# Patient Record
Sex: FEMALE | Race: BLACK OR AFRICAN AMERICAN | Employment: FULL TIME | URBAN - METROPOLITAN AREA
[De-identification: names, ages, dates, MRNs, and addresses within clinical notes are randomized per-mention and may not be internally consistent; named-entity substitution may affect disease eponyms.]

---

## 2020-03-16 ENCOUNTER — HOSPITAL ENCOUNTER (EMERGENCY)
Facility: HOSPITAL | Age: 35
Discharge: HOME | End: 2020-03-16
Attending: EMERGENCY MEDICINE
Payer: COMMERCIAL

## 2020-03-16 ENCOUNTER — OFFICE VISIT (OUTPATIENT)
Dept: OBGYN CLINIC | Facility: CLINIC | Age: 35
End: 2020-03-16

## 2020-03-16 VITALS
TEMPERATURE: 100 F | OXYGEN SATURATION: 99 % | DIASTOLIC BLOOD PRESSURE: 121 MMHG | RESPIRATION RATE: 16 BRPM | WEIGHT: 230 LBS | HEART RATE: 93 BPM | BODY MASS INDEX: 38.32 KG/M2 | SYSTOLIC BLOOD PRESSURE: 158 MMHG | HEIGHT: 65 IN

## 2020-03-16 VITALS — HEART RATE: 85 BPM | SYSTOLIC BLOOD PRESSURE: 180 MMHG | DIASTOLIC BLOOD PRESSURE: 100 MMHG

## 2020-03-16 DIAGNOSIS — I10 ESSENTIAL HYPERTENSION: Primary | ICD-10-CM

## 2020-03-16 DIAGNOSIS — I10 ASYMPTOMATIC HYPERTENSION: Primary | ICD-10-CM

## 2020-03-16 PROCEDURE — 99201 OFFICE/OUTPT VISIT,NEW,LEVL I: CPT | Performed by: ADVANCED PRACTICE MIDWIFE

## 2020-03-16 PROCEDURE — 99282 EMERGENCY DEPT VISIT SF MDM: CPT

## 2020-03-17 NOTE — ED PROVIDER NOTES
Patient Seen in: [facility] AND [facility] Emergency Department      History   Patient presents with:  Hypertension    Stated Complaint: HTN    HPI    12-year-old female with no significant past medical history here with complaints of asymptomatic hypertension f Vitals   BP 03/16/20 1839 (!) 174/115   Pulse 03/16/20 1839 95   Resp 03/16/20 1839 16   Temp 03/16/20 1839 99.5 °F (37.5 °C)   Temp src 03/16/20 1839 Oral   SpO2 03/16/20 1839 100 %   O2 Device 03/16/20 1909 None (Room air)       Current:BP (!) 158/121 Patient's condition was stable during Emergency Department evaluation.      34yoF with asymptomatic hypertension  - I personally reviewed and interpreted all the ED vitals  - afebrile, hemodynamically stable  -  Supportive care discussed  - pt to f/u with P

## 2020-03-17 NOTE — ED NOTES
Pt states [facility]. States when they took her BP was elevated and wouldn't do the procedure. States has been having side effects from the birth control, intermittent headaches, blurry vision.  States all starte

## 2020-03-21 ENCOUNTER — TELEPHONE (OUTPATIENT)
Dept: OBGYN CLINIC | Facility: CLINIC | Age: 35
End: 2020-03-21

## 2020-03-21 NOTE — TELEPHONE ENCOUNTER
Pt requesting Nexplanon removal. Advised pt we are not scheduling procedures for at least the next 2 weeks. Pt states she has been having heavy bleeding since Nexplanon was inserted at [facility] last year. Pt states she is changing her pad every 2 hours and passing clots, the largest the size of a quarter. Pt denies dizziness. Pt also states she has been monitoring her BP at home since she was sent to ER on 3/16 by MBW. Pt states her BP has been elevated and she has been having frequent HA and dizziness at times. Pt states she just checked her BP now and it was 160/110. Pt states she currently has HA and rates pain a 5 out of 10. Pt denies dizziness or blurry vision. Pt states she still has not established care with a PCP. Pt advised with BP this elevated and HA she needs to go back to the ER now for evaluation. Pt also advised she needs to establish care with PCP ASAP. Pt agreed and voiced understanding. Sent to CNM on call for any further rec's.

## 2020-03-22 ENCOUNTER — TELEPHONE (OUTPATIENT)
Dept: OBGYN CLINIC | Facility: CLINIC | Age: 35
End: 2020-03-22

## 2020-03-22 NOTE — TELEPHONE ENCOUNTER
PC to patient to f/u after receiving message from RN. No answer. Left message. Agree with triage advice given. Pt needs immediate evaluation of blood pressure and stabilization to be sure not in hypertensive crisis.  If she does return call have her screene

## 2020-03-23 PROBLEM — I10 ESSENTIAL HYPERTENSION: Status: ACTIVE | Noted: 2020-03-23

## 2020-03-23 NOTE — PROGRESS NOTES
HPI:   Kira Horowitz is a 29year old female who presents for a gyne problem exam. Patient presents with:  Gyn Exam: Patient reports heavy bleeding with nexplanon since the end of 08/2019 placed at [facility], [facility] during a ectopic pregnancy and having veronica

## 2020-04-13 ENCOUNTER — TELEPHONE (OUTPATIENT)
Dept: INTERNAL MEDICINE CLINIC | Facility: CLINIC | Age: 35
End: 2020-04-13

## 2020-04-13 ENCOUNTER — OFFICE VISIT (OUTPATIENT)
Dept: OBGYN CLINIC | Facility: CLINIC | Age: 35
End: 2020-04-13

## 2020-04-13 ENCOUNTER — TELEPHONE (OUTPATIENT)
Dept: OBGYN CLINIC | Facility: CLINIC | Age: 35
End: 2020-04-13

## 2020-04-13 ENCOUNTER — NURSE TRIAGE (OUTPATIENT)
Dept: OTHER | Age: 35
End: 2020-04-13

## 2020-04-13 VITALS
HEART RATE: 85 BPM | WEIGHT: 229 LBS | BODY MASS INDEX: 38 KG/M2 | SYSTOLIC BLOOD PRESSURE: 165 MMHG | DIASTOLIC BLOOD PRESSURE: 114 MMHG

## 2020-04-13 DIAGNOSIS — Z30.46 ENCOUNTER FOR NEXPLANON REMOVAL: Primary | ICD-10-CM

## 2020-04-13 PROCEDURE — 11976 REMOVE CONTRACEPTIVE CAPSULE: CPT | Performed by: ADVANCED PRACTICE MIDWIFE

## 2020-04-13 NOTE — TELEPHONE ENCOUNTER
Spoke with pt and informed  Her Dr Nirmal Moss message from 4/13/20 telephone triage encounter. She prefers to come in tomorrow 4/14/20. Dr Nirmal Moss please advise what time you want pt to be seen at for tomorrow?

## 2020-04-13 NOTE — PROCEDURES
Nexplanon Removal    Pregnancy Results: negative from urine test   Birth control method(s) used:   Nexplanon    Consent was obtained from the patient.     Removal:  1 % lidocaine with Epinephrine was injected underneath the tip of the Nexplanon jane that is

## 2020-04-13 NOTE — TELEPHONE ENCOUNTER
Pt received [facility] prior to having ruptured ectopic pregnancy in august 2019. Since then, pt reports heavy bleeding & clotting w/ elevated bp's with & without HA. Was in cnm office 3/16 w/ elevated bp's & went to ER.  Was discharged from ER & ins

## 2020-04-13 NOTE — TELEPHONE ENCOUNTER
Per MES, pt to come in today ASAP for nexplanon removal & will assist pt w/ scheduling w/ IM. Pt states she can be at [facility] in 30 mins. Appt scheduled. Instructions given.  Pt verbalized an understanding & agrees w/ plan

## 2020-04-13 NOTE — TELEPHONE ENCOUNTER
Spoke to Karson Ferrer at [facility] OF THE [facility] and she notified the nurse in the office. The nurse in the office will communicate with Dr. Shahriar Hernandez regarding scheduling.

## 2020-04-14 ENCOUNTER — OFFICE VISIT (OUTPATIENT)
Dept: INTERNAL MEDICINE CLINIC | Facility: CLINIC | Age: 35
End: 2020-04-14

## 2020-04-14 VITALS
RESPIRATION RATE: 18 BRPM | HEIGHT: 65 IN | SYSTOLIC BLOOD PRESSURE: 154 MMHG | WEIGHT: 230 LBS | DIASTOLIC BLOOD PRESSURE: 108 MMHG | BODY MASS INDEX: 38.32 KG/M2 | HEART RATE: 79 BPM

## 2020-04-14 DIAGNOSIS — I10 ESSENTIAL HYPERTENSION: Primary | ICD-10-CM

## 2020-04-14 PROCEDURE — 99203 OFFICE O/P NEW LOW 30 MIN: CPT | Performed by: INTERNAL MEDICINE

## 2020-04-14 RX ORDER — AMLODIPINE BESYLATE 5 MG/1
5 TABLET ORAL DAILY
Qty: 30 TABLET | Refills: 3 | Status: SHIPPED | OUTPATIENT
Start: 2020-04-14 | End: 2020-05-15

## 2020-04-14 NOTE — PATIENT INSTRUCTIONS
Please obtain blood and urine testing and EKG in about 1 month. Begin amlodipine 5 mg 1 pill daily. Please try to limit dietary salt and calories, exercise regularly and lose weight. Return visit in 1 month for a blood pressure check.

## 2020-04-14 NOTE — H&P
Kira Horowitz is a 29year old female who presents this morning for her initial visit with concerns about elevated blood pressure.   HPI:   Last August, at [employer] [facility], after undergoing laparoscopic right salpingectomy for an ectopic pregnancy, Nexplanon was History    Tobacco Use      Smoking status: Never Smoker      Smokeless tobacco: Never Used    Alcohol use: Yes      Frequency: 2-4 times a month      Comment: Occasional wine    Drug use: Never         REVIEW OF SYSTEMS:   GENERAL: No fever  LUNGS: No cou pharmacy. Discussed and recommended healthy diet to include sodium restriction, regular exercise and attempts at weight loss. Return visit in 1 month for blood pressure check.  - COMP METABOLIC PANEL (14); Future  - CBC, PLATELET; NO DIFFERENTIAL;  Future

## 2020-04-16 ENCOUNTER — TELEPHONE (OUTPATIENT)
Dept: INTERNAL MEDICINE CLINIC | Facility: CLINIC | Age: 35
End: 2020-04-16

## 2020-04-16 NOTE — TELEPHONE ENCOUNTER
----- Message from Amanda Michaels sent at 4/16/2020 11:43 AM CDT -----  Regarding: Prescription Question  Contact: [phone]  Good Morning,   I have a question about my medication.  I started taking it yesterday and I have had headaches and fell jitter

## 2020-04-17 NOTE — TELEPHONE ENCOUNTER
Spoke with patient ( verified) and relayed Dr. Yuliana Gray message below--patient verbalizes understanding and agreement. No further questions/concerns at this time.

## 2020-04-17 NOTE — TELEPHONE ENCOUNTER
Followed up with patient, reports took Amlodipine this morning, was 2nd day on new med, all day was with HA, feeling jittery and shaky and with nausea, did have symptoms yesterday but was mild, today was worse.  Is feeling a little better now but symptoms t

## 2020-04-17 NOTE — TELEPHONE ENCOUNTER
Recommend she take amlodipine 5 mg 1/2 tablet daily initially, instead of one tablet, for a few days to see if symptoms are better, then increase to one tablet daily if she can.  She certainly needs medication for her blood pressure, and was having headache

## 2020-05-10 ENCOUNTER — HOSPITAL ENCOUNTER (EMERGENCY)
Facility: HOSPITAL | Age: 35
Discharge: HOME | End: 2020-05-10
Payer: COMMERCIAL

## 2020-05-10 VITALS
HEART RATE: 94 BPM | BODY MASS INDEX: 38.32 KG/M2 | OXYGEN SATURATION: 98 % | DIASTOLIC BLOOD PRESSURE: 107 MMHG | WEIGHT: 230 LBS | HEIGHT: 65 IN | TEMPERATURE: 98 F | SYSTOLIC BLOOD PRESSURE: 145 MMHG | RESPIRATION RATE: 20 BRPM

## 2020-05-10 DIAGNOSIS — L50.9 URTICARIA: Primary | ICD-10-CM

## 2020-05-10 PROCEDURE — 99283 EMERGENCY DEPT VISIT LOW MDM: CPT

## 2020-05-10 PROCEDURE — 81025 URINE PREGNANCY TEST: CPT

## 2020-05-10 RX ORDER — PREDNISONE 20 MG/1
40 TABLET ORAL DAILY
Qty: 10 TABLET | Refills: 0 | Status: SHIPPED | OUTPATIENT
Start: 2020-05-10 | End: 2020-05-15

## 2020-05-10 RX ORDER — HYDROXYZINE PAMOATE 25 MG/1
25 CAPSULE ORAL 3 TIMES DAILY PRN
Qty: 15 CAPSULE | Refills: 0 | Status: SHIPPED | OUTPATIENT
Start: 2020-05-10 | End: 2020-05-15

## 2020-05-10 RX ORDER — PREDNISONE 20 MG/1
60 TABLET ORAL ONCE
Status: COMPLETED | OUTPATIENT
Start: 2020-05-10 | End: 2020-05-10

## 2020-05-10 RX ORDER — HYDROXYZINE HYDROCHLORIDE 25 MG/1
25 TABLET, FILM COATED ORAL ONCE
Status: COMPLETED | OUTPATIENT
Start: 2020-05-10 | End: 2020-05-10

## 2020-05-10 NOTE — ED INITIAL ASSESSMENT (HPI)
Generalized rash to upper body starting Thursday and spreading now. Breathing normal and unlabored. C/o extreme itchiness.

## 2020-05-10 NOTE — ED NOTES
Pt states on Thursday began notice a rash on rt forearm. State it has since spread all over body and itching. States has been taking benadryl. States last dose was 2 tabs at 10:00am and cortisone.

## 2020-05-10 NOTE — ED PROVIDER NOTES
Patient Seen in: [facility] AND [facility] Emergency Department      History   Patient presents with:  Rash Skin Problem    Stated Complaint: rash on upper body and face    35yo/f with hx of poorly controlled hypertension reports to the ED With complaints of enid Pupils: Pupils are equal, round, and reactive to light. Neck:      Musculoskeletal: Normal range of motion and neck supple. Cardiovascular:      Rate and Rhythm: Normal rate and regular rhythm. Heart sounds: Normal heart sounds.    Pulmonary: hydrOXYzine Pamoate (VISTARIL) 25 MG Oral Cap  Take 1 capsule (25 mg total) by mouth 3 (three) times daily as needed for Itching.   Qty: 15 capsule Refills: 0

## 2020-05-15 ENCOUNTER — VIRTUAL PHONE E/M (OUTPATIENT)
Dept: INTERNAL MEDICINE CLINIC | Facility: CLINIC | Age: 35
End: 2020-05-15

## 2020-05-15 DIAGNOSIS — I10 ESSENTIAL HYPERTENSION: Primary | ICD-10-CM

## 2020-05-15 PROCEDURE — 99213 OFFICE O/P EST LOW 20 MIN: CPT | Performed by: INTERNAL MEDICINE

## 2020-05-15 RX ORDER — AMLODIPINE BESYLATE 10 MG/1
10 TABLET ORAL DAILY
Qty: 30 TABLET | Refills: 2 | Status: SHIPPED | OUTPATIENT
Start: 2020-05-15 | End: 2021-01-16

## 2020-05-15 NOTE — PROGRESS NOTES
Virtual Telephone Check-In    Esperanza Meneses verbally consents to a Virtual/Telephone Check-In visit on 05/15/20. Patient understands and accepts financial responsibility for any deductible, co-insurance and/or co-pays associated with this service. Comment: Occasional wine    Drug use: Never       EXAM:   GENERAL: Pleasant female conversing normally in no obvious distress  HEENT: Voice normal  LUNGS: Breathing nonlabored  NEURO: Alert and appropriate.   Mental status grossly intact      ASSESSMENT AND

## 2020-05-15 NOTE — PATIENT INSTRUCTIONS
Please increase amlodipine to 10 mg daily. Obtain blood tests ordered at your visit 1 month ago soon. Office visit in 1 month.

## 2020-11-23 ENCOUNTER — OFFICE VISIT (OUTPATIENT)
Dept: ORTHOPEDICS CLINIC | Facility: CLINIC | Age: 35
End: 2020-11-23

## 2020-11-23 ENCOUNTER — HOSPITAL ENCOUNTER (OUTPATIENT)
Dept: GENERAL RADIOLOGY | Facility: HOSPITAL | Age: 35
Discharge: HOME | End: 2020-11-23
Attending: ORTHOPAEDIC SURGERY
Payer: COMMERCIAL

## 2020-11-23 ENCOUNTER — OFFICE VISIT (OUTPATIENT)
Dept: INTERNAL MEDICINE CLINIC | Facility: CLINIC | Age: 35
End: 2020-11-23

## 2020-11-23 ENCOUNTER — TELEPHONE (OUTPATIENT)
Dept: ORTHOPEDICS CLINIC | Facility: CLINIC | Age: 35
End: 2020-11-23

## 2020-11-23 VITALS — HEART RATE: 92 BPM | SYSTOLIC BLOOD PRESSURE: 155 MMHG | DIASTOLIC BLOOD PRESSURE: 103 MMHG | RESPIRATION RATE: 20 BRPM

## 2020-11-23 VITALS
SYSTOLIC BLOOD PRESSURE: 166 MMHG | HEART RATE: 93 BPM | DIASTOLIC BLOOD PRESSURE: 102 MMHG | RESPIRATION RATE: 20 BRPM | WEIGHT: 230 LBS | HEIGHT: 65 IN | BODY MASS INDEX: 38.32 KG/M2

## 2020-11-23 DIAGNOSIS — S82.852A CLOSED TRIMALLEOLAR FRACTURE OF LEFT ANKLE, INITIAL ENCOUNTER: Primary | ICD-10-CM

## 2020-11-23 DIAGNOSIS — S82.852A CLOSED DISPLACED TRIMALLEOLAR FRACTURE OF LEFT ANKLE, INITIAL ENCOUNTER: Primary | ICD-10-CM

## 2020-11-23 DIAGNOSIS — S82.852A CLOSED DISPLACED TRIMALLEOLAR FRACTURE OF LEFT ANKLE, INITIAL ENCOUNTER: ICD-10-CM

## 2020-11-23 DIAGNOSIS — I10 ESSENTIAL HYPERTENSION: ICD-10-CM

## 2020-11-23 DIAGNOSIS — S82.402D TIBIA/FIBULA FRACTURE, LEFT, CLOSED, WITH ROUTINE HEALING, SUBSEQUENT ENCOUNTER: Primary | ICD-10-CM

## 2020-11-23 DIAGNOSIS — S82.202D TIBIA/FIBULA FRACTURE, LEFT, CLOSED, WITH ROUTINE HEALING, SUBSEQUENT ENCOUNTER: Primary | ICD-10-CM

## 2020-11-23 PROCEDURE — 3077F SYST BP >= 140 MM HG: CPT | Performed by: INTERNAL MEDICINE

## 2020-11-23 PROCEDURE — 99213 OFFICE O/P EST LOW 20 MIN: CPT | Performed by: INTERNAL MEDICINE

## 2020-11-23 PROCEDURE — 3077F SYST BP >= 140 MM HG: CPT | Performed by: ORTHOPAEDIC SURGERY

## 2020-11-23 PROCEDURE — 73610 X-RAY EXAM OF ANKLE: CPT | Performed by: ORTHOPAEDIC SURGERY

## 2020-11-23 PROCEDURE — 3008F BODY MASS INDEX DOCD: CPT | Performed by: INTERNAL MEDICINE

## 2020-11-23 PROCEDURE — 27840 TREAT ANKLE DISLOCATION: CPT | Performed by: ORTHOPAEDIC SURGERY

## 2020-11-23 PROCEDURE — 99243 OFF/OP CNSLTJ NEW/EST LOW 30: CPT | Performed by: ORTHOPAEDIC SURGERY

## 2020-11-23 PROCEDURE — 3080F DIAST BP >= 90 MM HG: CPT | Performed by: INTERNAL MEDICINE

## 2020-11-23 PROCEDURE — 3080F DIAST BP >= 90 MM HG: CPT | Performed by: ORTHOPAEDIC SURGERY

## 2020-11-23 RX ORDER — HYDROCODONE BITARTRATE AND ACETAMINOPHEN 5; 325 MG/1; MG/1
1 TABLET ORAL EVERY 6 HOURS PRN
Qty: 30 TABLET | Refills: 0 | Status: SHIPPED | OUTPATIENT
Start: 2020-11-23 | End: 2021-04-16 | Stop reason: ALTCHOICE

## 2020-11-23 RX ORDER — HYDROCODONE BITARTRATE AND ACETAMINOPHEN 5; 325 MG/1; MG/1
1 TABLET ORAL
COMMUNITY
Start: 2020-11-22 | End: 2021-04-16 | Stop reason: ALTCHOICE

## 2020-11-23 NOTE — PROCEDURES
The patient identified the left ankle as the correct procedure site. This was verified with the consent and with 2 patient identifiers. The anterior medial ankle injection site was prepped with betadine and alcohol.   A 22-gauge needle was introduced into

## 2020-11-23 NOTE — PROGRESS NOTES
Per Dr Pippa Riggs request was draw 1 syringe with 5 ml Marcaine 0.5% and 5 ml Lidocaine 1% for injection in left leg. Patient left office without obtaining post injection vitals. Dee Jade

## 2020-11-23 NOTE — TELEPHONE ENCOUNTER
Type of surgery:Left ankle fracture trimal open reduction pilon  Date:12/2/20  Location:[facility]  Medical Clearance:      *Medical:no      *Dental:      *Other:  Prior Authorization Status:approve  Workers Comp:no  Medacta/Jacqui:no  Checklist:no  Other:no

## 2020-11-23 NOTE — PROGRESS NOTES
Isael Garcia is a 28year old female. Patient presents with:  ER F/U: pt went to ER after breaking left leg yesterday     HPI:   Meredith Apple presents this afternoon, in a wheelchair, for ER follow-up.     She fell down stairs in her apartment yesterday, and Resp 20   Ht 5' 5\" (1.651 m)   Wt 230 lb (104.3 kg)   Breastfeeding No   BMI 38.27 kg/m²   LUNGS: Resonant to percussion and clear to auscultation  CARDIAC: Rhythm regular S1 S2 normal without murmur or edema  ABDOMEN: Bowel sounds normal soft nontender

## 2020-11-23 NOTE — PATIENT INSTRUCTIONS
Please schedule an appointment with Orthopedics. Please take amlodipine 10 mg daily. Please obtain labs that were ordered in April soon. Return visit in 1 month.

## 2020-11-23 NOTE — H&P
NURSING INTAKE COMMENTS: Patient presents with:   Injury: Left ankle - onset 11/22/2020 when she fell down the stairs and she fx her leg in multiple places - she was at [facility] ER and had x-ray - we havethe cd with immages and  the report in care everywhere - weight gain  SKIN: denies worrisome skin lesions  EYES: denies blurred vision or double vision  HEENT: denies new nasal congestion  PULM: denies shortness of breath or cough  CARDIOVASCULAR: denies chest pain  GI: denies emesis, no diarrhea  :  denies dy initial encounter        Assessment: Closed left ankle trimalleolar fracture–dislocation with large posterior malleolus fracture extending into the medial malleolus.     Plan: I discussed further treatment options in detail with the patient and recommended

## 2020-11-29 ENCOUNTER — HOSPITAL ENCOUNTER (OUTPATIENT)
Dept: CT IMAGING | Facility: HOSPITAL | Age: 35
Discharge: HOME | End: 2020-11-29
Attending: ORTHOPAEDIC SURGERY
Payer: COMMERCIAL

## 2020-11-29 ENCOUNTER — LAB REQUISITION (OUTPATIENT)
Dept: LAB | Facility: HOSPITAL | Age: 35
End: 2020-11-29
Payer: COMMERCIAL

## 2020-11-29 DIAGNOSIS — S82.852A CLOSED DISPLACED TRIMALLEOLAR FRACTURE OF LEFT ANKLE, INITIAL ENCOUNTER: ICD-10-CM

## 2020-11-29 DIAGNOSIS — Z01.818 ENCOUNTER FOR OTHER PREPROCEDURAL EXAMINATION: ICD-10-CM

## 2020-11-29 PROCEDURE — 73700 CT LOWER EXTREMITY W/O DYE: CPT | Performed by: ORTHOPAEDIC SURGERY

## 2020-11-29 PROCEDURE — 76376 3D RENDER W/INTRP POSTPROCES: CPT | Performed by: ORTHOPAEDIC SURGERY

## 2020-12-02 ENCOUNTER — TELEPHONE (OUTPATIENT)
Dept: SURGERY | Age: 35
End: 2020-12-02

## 2020-12-02 ENCOUNTER — SURGERY CENTER DOCUMENTATION (OUTPATIENT)
Dept: SURGERY | Age: 35
End: 2020-12-02

## 2020-12-02 ENCOUNTER — TELEPHONE (OUTPATIENT)
Dept: ORTHOPEDICS CLINIC | Facility: CLINIC | Age: 35
End: 2020-12-02

## 2020-12-02 DIAGNOSIS — Z47.89 ORTHOPEDIC AFTERCARE: Primary | ICD-10-CM

## 2020-12-02 DIAGNOSIS — S82.852A CLOSED DISPLACED TRIMALLEOLAR FRACTURE OF LEFT ANKLE, INITIAL ENCOUNTER: ICD-10-CM

## 2020-12-02 RX ORDER — HYDROCODONE BITARTRATE AND ACETAMINOPHEN 5; 325 MG/1; MG/1
1 TABLET ORAL EVERY 6 HOURS PRN
Qty: 30 TABLET | Refills: 0 | Status: SHIPPED | OUTPATIENT
Start: 2020-12-02 | End: 2020-12-14

## 2020-12-02 RX ORDER — DOCUSATE SODIUM 100 MG/1
100 CAPSULE, LIQUID FILLED ORAL 2 TIMES DAILY
Qty: 60 CAPSULE | Refills: 0 | Status: SHIPPED | OUTPATIENT
Start: 2020-12-02 | End: 2021-12-20

## 2020-12-02 NOTE — TELEPHONE ENCOUNTER
High priority order placed. Called Arabella at [facility] and she will call [employer] to get approved for mobility systems.

## 2020-12-02 NOTE — TELEPHONE ENCOUNTER
Please clarify what you are ordering? A wheel-a bout is a ramp for pt home. A roll-a-bout is a knee walker?

## 2020-12-02 NOTE — TELEPHONE ENCOUNTER
Patient requesting [id number] given left ankle fracture and surgery. Please place DME for [id number] and contact patient with instructions. She also needs 2 week f/u visit in clinic.

## 2020-12-02 NOTE — PROCEDURES
[facility]  Operative Note     Zahra Alvares Location: OR   CSN [id number] MRN [id number]   Admission Date (Not on file) Operation Date 12/2/2020   Attending Physician No att. providers found Operating Physician Misa Castillo alternatives. The patient agreed to proceed with surgery. Written and verbal consent was given by the patient for the procedure.   The patient was brought back to the operating room underwent general anesthesia with endotracheal tube and was positioned in fracture was fixed with a posterior plate over the posterior malleolus in antiglide fashion compressing superiorly with 3.5 mm cortex screws drilled bicortically and then locking the plate in place distally using 2.7 mm locking screws.   The medial malleoli

## 2020-12-16 RX ORDER — HYDROCODONE BITARTRATE AND ACETAMINOPHEN 5; 325 MG/1; MG/1
1 TABLET ORAL EVERY 6 HOURS PRN
Qty: 30 TABLET | Refills: 0 | Status: SHIPPED | OUTPATIENT
Start: 2020-12-16 | End: 2021-04-16 | Stop reason: ALTCHOICE

## 2020-12-16 NOTE — TELEPHONE ENCOUNTER
Sx 12/2/20 left ankle trimalleolar fx ORIF  Last rx given 12/2  #30  PO appt scheduled 12/18  Please sign if you approve?

## 2020-12-18 ENCOUNTER — TELEPHONE (OUTPATIENT)
Dept: ORTHOPEDICS CLINIC | Facility: CLINIC | Age: 35
End: 2020-12-18

## 2020-12-18 ENCOUNTER — OFFICE VISIT (OUTPATIENT)
Dept: ORTHOPEDICS CLINIC | Facility: CLINIC | Age: 35
End: 2020-12-18

## 2020-12-18 ENCOUNTER — HOSPITAL ENCOUNTER (OUTPATIENT)
Dept: GENERAL RADIOLOGY | Facility: HOSPITAL | Age: 35
Discharge: HOME | End: 2020-12-18
Attending: ORTHOPAEDIC SURGERY
Payer: COMMERCIAL

## 2020-12-18 DIAGNOSIS — Z47.89 ORTHOPEDIC AFTERCARE: ICD-10-CM

## 2020-12-18 DIAGNOSIS — Z47.89 ORTHOPEDIC AFTERCARE: Primary | ICD-10-CM

## 2020-12-18 PROCEDURE — 73610 X-RAY EXAM OF ANKLE: CPT | Performed by: ORTHOPAEDIC SURGERY

## 2020-12-18 PROCEDURE — 99024 POSTOP FOLLOW-UP VISIT: CPT | Performed by: ORTHOPAEDIC SURGERY

## 2020-12-18 PROCEDURE — L4386 NON-PNEUM WALK BOOT PRE CST: HCPCS | Performed by: ORTHOPAEDIC SURGERY

## 2020-12-18 RX ORDER — TRAMADOL HYDROCHLORIDE 50 MG/1
50 TABLET ORAL EVERY 8 HOURS PRN
Qty: 30 TABLET | Refills: 0 | Status: SHIPPED | OUTPATIENT
Start: 2020-12-18 | End: 2021-04-16 | Stop reason: ALTCHOICE

## 2020-12-18 NOTE — TELEPHONE ENCOUNTER
Pt came to [facility] OF THE [facility] desk ORTHO, completed HIPPA and Form Completion, paid $25 fee. Pt stated she needed forms by 12/22/2020 however was told of 10 - 15 day processing.   Scanned to forms, took to ADRIANO

## 2020-12-18 NOTE — PROGRESS NOTES
NURSING INTAKE COMMENTS: Patient presents with:  Post-Op: Left ankle - 1st visit - had sx on 12/2/2020 - states she is ok - has a little pain rated as 5-6/10 on and off, has some tingling when the leg is down too long and still has numbness on the lateral use: Yes        Frequency: 2-4 times a month        Comment: Occasional wine      Drug use: Never      Sexual activity: Not on file       Review of Systems:  GENERAL: feels generally well, no recent fevers or chills, no significant weight loss or weight ga (cpt=73700)    Result Date: 11/29/2020  PROCEDURE: CT ANKLE LEFT (CPT=73700)  COMPARISON: [facility], [facility] 2nd Floor, XR ANKLE (MIN 3 VIEWS), LEFT (CPT=73610), 11/23/2020, 3:53 PM.  External Exams, XR ANKLE (MIN 3 VIEWS), LEFT (CPT=73610 posterior subluxation at the tibia talar joint. 3. Lesser incidental findings as above.       Dictated by (CST): Mckenzie Kim MD on 11/29/2020 at 9:09 AM     Finalized by (CST): Mckenzie Kim MD on 11/29/2020 at 9:15 AM             Left ankle AP,

## 2020-12-31 NOTE — TELEPHONE ENCOUNTER
Dr. Abdoulaye Petersen,      Please sign off on form:  -Highlight the patient and hit \"Chart\" button.   -In Chart Review, w/in the Encounter tab - click 1 time on the Telephone call encounter for 12/18/20 Scroll down the telephone encounter.  -Click \"scan on\" blue

## 2021-01-04 NOTE — TELEPHONE ENCOUNTER
Completed Disab faxed to [address] [phone]. Several failed fax attempts. Will try again and send copy to patient.

## 2021-01-05 NOTE — TELEPHONE ENCOUNTER
Faxed disab to [facility] - [phone]. Mailed copy to pt.  Notified via [facility]

## 2021-01-16 RX ORDER — AMLODIPINE BESYLATE 10 MG/1
10 TABLET ORAL DAILY
Qty: 30 TABLET | Refills: 0 | Status: SHIPPED | OUTPATIENT
Start: 2021-01-16 | End: 2021-12-20

## 2021-01-29 ENCOUNTER — OFFICE VISIT (OUTPATIENT)
Dept: ORTHOPEDICS CLINIC | Facility: CLINIC | Age: 36
End: 2021-01-29
Payer: COMMERCIAL

## 2021-01-29 ENCOUNTER — HOSPITAL ENCOUNTER (OUTPATIENT)
Dept: GENERAL RADIOLOGY | Facility: HOSPITAL | Age: 36
Discharge: HOME | End: 2021-01-29
Attending: ORTHOPAEDIC SURGERY
Payer: COMMERCIAL

## 2021-01-29 DIAGNOSIS — Z47.89 ORTHOPEDIC AFTERCARE: Primary | ICD-10-CM

## 2021-01-29 DIAGNOSIS — S82.852A CLOSED DISPLACED TRIMALLEOLAR FRACTURE OF LEFT ANKLE, INITIAL ENCOUNTER: ICD-10-CM

## 2021-01-29 DIAGNOSIS — Z47.89 ORTHOPEDIC AFTERCARE: ICD-10-CM

## 2021-01-29 PROCEDURE — 73610 X-RAY EXAM OF ANKLE: CPT | Performed by: ORTHOPAEDIC SURGERY

## 2021-01-29 PROCEDURE — 99024 POSTOP FOLLOW-UP VISIT: CPT | Performed by: ORTHOPAEDIC SURGERY

## 2021-01-29 NOTE — PROGRESS NOTES
NURSING INTAKE COMMENTS: Patient presents with:  Post-Op: s/p L ankle f/u - had sx on 12/2/2020 - states she is ok, no pain , has numbness on the medial aspect of the ankle and on the lateral aspect by the 5th toe       HPI: This 28year old female present Sexual activity: Not on file       Review of Systems:  GENERAL: feels generally well, no recent fevers or chills, no significant weight loss or weight gain  MUSCULOSKELETAL: See HPI  NEURO: See HPI    Physical Examination:    There were no vitals taken for

## 2021-02-23 ENCOUNTER — HOSPITAL ENCOUNTER (OUTPATIENT)
Dept: GENERAL RADIOLOGY | Facility: HOSPITAL | Age: 36
Discharge: HOME | End: 2021-02-23
Attending: ORTHOPAEDIC SURGERY
Payer: COMMERCIAL

## 2021-02-23 ENCOUNTER — OFFICE VISIT (OUTPATIENT)
Dept: ORTHOPEDICS CLINIC | Facility: CLINIC | Age: 36
End: 2021-02-23
Payer: COMMERCIAL

## 2021-02-23 ENCOUNTER — MED REC SCAN ONLY (OUTPATIENT)
Dept: ADMINISTRATIVE | Age: 36
End: 2021-02-23

## 2021-02-23 VITALS — HEIGHT: 65 IN | BODY MASS INDEX: 38.15 KG/M2 | WEIGHT: 229 LBS

## 2021-02-23 DIAGNOSIS — Z47.89 ORTHOPEDIC AFTERCARE: ICD-10-CM

## 2021-02-23 DIAGNOSIS — Z47.89 ORTHOPEDIC AFTERCARE: Primary | ICD-10-CM

## 2021-02-23 PROCEDURE — 3008F BODY MASS INDEX DOCD: CPT | Performed by: ORTHOPAEDIC SURGERY

## 2021-02-23 PROCEDURE — 73610 X-RAY EXAM OF ANKLE: CPT | Performed by: ORTHOPAEDIC SURGERY

## 2021-02-23 PROCEDURE — 99024 POSTOP FOLLOW-UP VISIT: CPT | Performed by: ORTHOPAEDIC SURGERY

## 2021-02-23 NOTE — PROGRESS NOTES
NURSING INTAKE COMMENTS: No chief complaint on file. HPI: This 28year old female presents today with complaints of follow-up left ankle trimalleolar fracture ORIF, routine 3-month follow-up. Pain is well controlled.   Patient has progressed weightbea Drug use: Never      Sexual activity: Not on file       Review of Systems:  GENERAL: feels generally well, no recent fevers or chills, no significant weight loss or weight gain  MUSCULOSKELETAL: See HPI  NEURO: See HPI    Physical Examination:    There wer No visible soft tissue swelling. EFFUSION: None visible. OTHER: Negative. CONCLUSION:  1.  Redemonstration of internal fixation of a trimalleolar fracture as discussed above with some interval healing at the fracture lines since previous study with

## 2021-02-24 NOTE — TELEPHONE ENCOUNTER
Dr. Mi Benavides,     Please sign off on form: Nini Rosenberg  -Highlight the patient and hit \"Chart\" button.   -In Chart Review, w/in the Encounter tab - click 1 time on the Telephone call encounter for 12/18/2020 Scroll down the telephone encounter.  -Click \

## 2021-03-02 ENCOUNTER — TELEPHONE (OUTPATIENT)
Dept: ORTHOPEDICS CLINIC | Facility: CLINIC | Age: 36
End: 2021-03-02

## 2021-03-02 NOTE — TELEPHONE ENCOUNTER
Katelyn from [facility] called wanting to know pt WB status. S/w Dr Raymundo Croft and he states pt can WB out of boot as much as she can tolerate.  Katelyn notified and had no further q's

## 2021-03-26 ENCOUNTER — PATIENT MESSAGE (OUTPATIENT)
Dept: ORTHOPEDICS CLINIC | Facility: CLINIC | Age: 36
End: 2021-03-26

## 2021-04-07 ENCOUNTER — TELEPHONE (OUTPATIENT)
Dept: ORTHOPEDICS CLINIC | Facility: CLINIC | Age: 36
End: 2021-04-07

## 2021-04-07 NOTE — TELEPHONE ENCOUNTER
Dr Tommy Lynch please advise:    Spoke to Sheila Mcdonnell from [facility] who has the following questions:  1) pt is very stiff. Asking if pt may be WB as tolerated at home? 2) WB as tolerated out in the community? 3) WB in normal supportive shoe at home? 4) Pt is scheduled with Tommy Lynch on 04/16/21 and Sheila Mcdonnell is asking if okay for her to either be on phone or video call during pt's appt with Tommy Lynch?

## 2021-04-08 NOTE — TELEPHONE ENCOUNTER
Called [facility] and spoke to [facility] who states Cherise Johnson is not there at this time. [place] to have Cherise Johnson call our office back tomorrow.

## 2021-04-08 NOTE — TELEPHONE ENCOUNTER
Patient can be weightbearing as tolerated both at home and in the community. She can wear normal supportive shoe at home. I would definitely love the physical therapist input during her the patient's appointment. Please assist with setting this up for the patient's appointment on April 16.

## 2021-04-14 ENCOUNTER — TELEPHONE (OUTPATIENT)
Dept: FAMILY MEDICINE CLINIC | Facility: CLINIC | Age: 36
End: 2021-04-14

## 2021-04-14 DIAGNOSIS — S82.852S CLOSED DISPLACED TRIMALLEOLAR FRACTURE OF LEFT ANKLE, SEQUELA: Primary | ICD-10-CM

## 2021-04-15 NOTE — TELEPHONE ENCOUNTER
Spoke with the patient and informed her of the message below. Pt was notified that it is still open with [facility]. Pt will call back to confirm the referral was authorized before her appointment.

## 2021-04-16 ENCOUNTER — OFFICE VISIT (OUTPATIENT)
Dept: ORTHOPEDICS CLINIC | Facility: CLINIC | Age: 36
End: 2021-04-16
Payer: COMMERCIAL

## 2021-04-16 ENCOUNTER — HOSPITAL ENCOUNTER (OUTPATIENT)
Dept: GENERAL RADIOLOGY | Facility: HOSPITAL | Age: 36
Discharge: HOME | End: 2021-04-16
Attending: ORTHOPAEDIC SURGERY
Payer: COMMERCIAL

## 2021-04-16 VITALS — HEIGHT: 65 IN | WEIGHT: 229 LBS | BODY MASS INDEX: 38.15 KG/M2

## 2021-04-16 DIAGNOSIS — S82.852D CLOSED TRIMALLEOLAR FRACTURE OF LEFT ANKLE WITH ROUTINE HEALING, SUBSEQUENT ENCOUNTER: Primary | ICD-10-CM

## 2021-04-16 DIAGNOSIS — Z47.89 ORTHOPEDIC AFTERCARE: ICD-10-CM

## 2021-04-16 PROCEDURE — 99212 OFFICE O/P EST SF 10 MIN: CPT | Performed by: ORTHOPAEDIC SURGERY

## 2021-04-16 PROCEDURE — 73610 X-RAY EXAM OF ANKLE: CPT | Performed by: ORTHOPAEDIC SURGERY

## 2021-04-16 PROCEDURE — 3008F BODY MASS INDEX DOCD: CPT | Performed by: ORTHOPAEDIC SURGERY

## 2021-04-17 NOTE — PROGRESS NOTES
NURSING INTAKE COMMENTS: Patient presents with: Ankle Pain: pt in for f/u left ankle fracture, denies any pain today      HPI: This 28year old female presents today with complaints of follow-up left ankle trimalleolar fracture ORIF on December 2, 2020. 38.11 kg/m²   General appearance: alert and oriented, not in acute distress  Vascular: 2+ and symmetric pulses  Skin: intact and benign  Neurologic: Bilateral sensation intact to light touch and motor strength intact grossly  Musculoskeletal: Left foot and the distal ankle with lateral, medial, and posterior malleolar fixation plates. There has been increased healing at the fibular fracture site with bone callus formation. No other significant interval changes.     Dictated by (CST): Chelsie Antoine MD on 4/1

## 2021-12-07 ENCOUNTER — HOSPITAL ENCOUNTER (EMERGENCY)
Facility: HOSPITAL | Age: 36
Discharge: HOME | End: 2021-12-08
Attending: EMERGENCY MEDICINE
Payer: COMMERCIAL

## 2021-12-07 DIAGNOSIS — I10 PRIMARY HYPERTENSION: Primary | ICD-10-CM

## 2021-12-07 DIAGNOSIS — J21.9 ACUTE BRONCHIOLITIS DUE TO UNSPECIFIED ORGANISM: ICD-10-CM

## 2021-12-07 DIAGNOSIS — R07.89 CHEST PAIN, ATYPICAL: ICD-10-CM

## 2021-12-07 PROCEDURE — 93010 ELECTROCARDIOGRAM REPORT: CPT | Performed by: EMERGENCY MEDICINE

## 2021-12-07 PROCEDURE — 93005 ELECTROCARDIOGRAM TRACING: CPT

## 2021-12-07 PROCEDURE — 99285 EMERGENCY DEPT VISIT HI MDM: CPT

## 2021-12-08 ENCOUNTER — APPOINTMENT (OUTPATIENT)
Dept: CT IMAGING | Facility: HOSPITAL | Age: 36
End: 2021-12-08
Attending: EMERGENCY MEDICINE
Payer: COMMERCIAL

## 2021-12-08 ENCOUNTER — APPOINTMENT (OUTPATIENT)
Dept: GENERAL RADIOLOGY | Facility: HOSPITAL | Age: 36
End: 2021-12-08
Attending: EMERGENCY MEDICINE
Payer: COMMERCIAL

## 2021-12-08 VITALS
RESPIRATION RATE: 18 BRPM | OXYGEN SATURATION: 99 % | HEIGHT: 65 IN | TEMPERATURE: 98 F | DIASTOLIC BLOOD PRESSURE: 89 MMHG | BODY MASS INDEX: 38.32 KG/M2 | HEART RATE: 69 BPM | SYSTOLIC BLOOD PRESSURE: 147 MMHG | WEIGHT: 230 LBS

## 2021-12-08 PROCEDURE — 71260 CT THORAX DX C+: CPT | Performed by: EMERGENCY MEDICINE

## 2021-12-08 PROCEDURE — 85060 BLOOD SMEAR INTERPRETATION: CPT | Performed by: EMERGENCY MEDICINE

## 2021-12-08 PROCEDURE — 84484 ASSAY OF TROPONIN QUANT: CPT | Performed by: EMERGENCY MEDICINE

## 2021-12-08 PROCEDURE — 71045 X-RAY EXAM CHEST 1 VIEW: CPT | Performed by: EMERGENCY MEDICINE

## 2021-12-08 PROCEDURE — 96374 THER/PROPH/DIAG INJ IV PUSH: CPT

## 2021-12-08 PROCEDURE — 81025 URINE PREGNANCY TEST: CPT

## 2021-12-08 PROCEDURE — 85025 COMPLETE CBC W/AUTO DIFF WBC: CPT | Performed by: EMERGENCY MEDICINE

## 2021-12-08 PROCEDURE — 85379 FIBRIN DEGRADATION QUANT: CPT | Performed by: EMERGENCY MEDICINE

## 2021-12-08 PROCEDURE — 80048 BASIC METABOLIC PNL TOTAL CA: CPT | Performed by: EMERGENCY MEDICINE

## 2021-12-08 RX ORDER — METOPROLOL TARTRATE 5 MG/5ML
5 INJECTION INTRAVENOUS ONCE
Status: COMPLETED | OUTPATIENT
Start: 2021-12-08 | End: 2021-12-08

## 2021-12-08 RX ORDER — ASPIRIN 81 MG/1
324 TABLET, CHEWABLE ORAL ONCE
Status: COMPLETED | OUTPATIENT
Start: 2021-12-08 | End: 2021-12-08

## 2021-12-08 NOTE — ED INITIAL ASSESSMENT (HPI)
Pt states she started feeling chest pain and checked her blood pressure and noticed it was high. Pt checked pressure at 11a and BP was 180/110.

## 2021-12-08 NOTE — ED PROVIDER NOTES
Patient Seen in: [facility] AND [facility] Emergency Department      History   Patient presents with:  Chest Pain Angina    Stated Complaint: chest pain    Subjective:   HPI    Patient is a 68-year-old female with history of hypertension who presents with genera intact, normal speech, normal gait, 5/5 motor strength in all extremities, no focal deficits  SKIN: warm, dry, no rashes        ED Course     Labs Reviewed   D-DIMER - Abnormal; Notable for the following components:       Result Value    D-Dimer 0.60 (*) M. D.      CTA chest with IV contrast    Comparison: Same day radiograph    IMPRESSION:  No pulmonary embolism identified. No aortic aneurysm or dissection. Two-vessel aortic arch is noted. Mild cardiomegaly. No pericardial effusion.     Small area of li patient on blood pressure medication. Discussed the risks and benefits of medicine. Patient feels comfortable with plan.     \"You had elevated blood pressure today and you need to follow up with your doctor for a repeat blood pressure check and further d

## 2021-12-20 ENCOUNTER — OFFICE VISIT (OUTPATIENT)
Dept: FAMILY MEDICINE CLINIC | Facility: CLINIC | Age: 36
End: 2021-12-20
Payer: COMMERCIAL

## 2021-12-20 VITALS
SYSTOLIC BLOOD PRESSURE: 182 MMHG | BODY MASS INDEX: 37 KG/M2 | HEART RATE: 74 BPM | TEMPERATURE: 98 F | DIASTOLIC BLOOD PRESSURE: 123 MMHG | RESPIRATION RATE: 16 BRPM | WEIGHT: 220 LBS

## 2021-12-20 DIAGNOSIS — I10 PRIMARY HYPERTENSION: Primary | ICD-10-CM

## 2021-12-20 PROCEDURE — 99214 OFFICE O/P EST MOD 30 MIN: CPT | Performed by: FAMILY MEDICINE

## 2021-12-20 PROCEDURE — 3080F DIAST BP >= 90 MM HG: CPT | Performed by: FAMILY MEDICINE

## 2021-12-20 PROCEDURE — 3077F SYST BP >= 140 MM HG: CPT | Performed by: FAMILY MEDICINE

## 2021-12-20 RX ORDER — LISINOPRIL AND HYDROCHLOROTHIAZIDE 12.5; 1 MG/1; MG/1
1 TABLET ORAL DAILY
Qty: 30 TABLET | Refills: 1 | Status: SHIPPED | OUTPATIENT
Start: 2021-12-20 | End: 2021-12-28 | Stop reason: DRUGHIGH

## 2021-12-20 NOTE — PROGRESS NOTES
HPI: Melissa Bridges is a 39year old female who presents for HTN. New to [facility]. Used to see Dr. Amalia Kahn. . Has 3 children- 13, 5 and 11 yr old. All born pre term. Diagnosed with HTN 3 years ago. Started after Implanon placed.   Had it removed but BP n

## 2021-12-28 ENCOUNTER — TELEPHONE (OUTPATIENT)
Dept: FAMILY MEDICINE CLINIC | Facility: CLINIC | Age: 36
End: 2021-12-28

## 2021-12-28 ENCOUNTER — OFFICE VISIT (OUTPATIENT)
Dept: FAMILY MEDICINE CLINIC | Facility: CLINIC | Age: 36
End: 2021-12-28
Payer: COMMERCIAL

## 2021-12-28 VITALS
WEIGHT: 223 LBS | BODY MASS INDEX: 37 KG/M2 | HEART RATE: 70 BPM | SYSTOLIC BLOOD PRESSURE: 162 MMHG | TEMPERATURE: 98 F | DIASTOLIC BLOOD PRESSURE: 112 MMHG | RESPIRATION RATE: 16 BRPM

## 2021-12-28 DIAGNOSIS — I10 PRIMARY HYPERTENSION: Primary | ICD-10-CM

## 2021-12-28 PROCEDURE — 99213 OFFICE O/P EST LOW 20 MIN: CPT | Performed by: FAMILY MEDICINE

## 2021-12-28 PROCEDURE — 3077F SYST BP >= 140 MM HG: CPT | Performed by: FAMILY MEDICINE

## 2021-12-28 PROCEDURE — 3080F DIAST BP >= 90 MM HG: CPT | Performed by: FAMILY MEDICINE

## 2021-12-28 RX ORDER — LISINOPRIL AND HYDROCHLOROTHIAZIDE 25; 20 MG/1; MG/1
1 TABLET ORAL DAILY
Qty: 30 TABLET | Refills: 0 | Status: SHIPPED | OUTPATIENT
Start: 2021-12-28

## 2021-12-28 NOTE — PROGRESS NOTES
HPI: Josef Valle is a 39year old female who presents for HTN follow-up. BP is slowly coming down. Pt reports she is feeling a little better. Has only had one headache. BPs at home are still high. Lowest diastolic is 544. Systolic is 343A/565D.    Denies ches

## 2021-12-28 NOTE — TELEPHONE ENCOUNTER
Spoke with Leti Ny and notified her appointment scheduled for 1/7/22 with Dr. Pushpa Dubois and notified her how to schedule her echocardiogram. No further action needed

## 2024-05-29 ENCOUNTER — HOSPITAL ENCOUNTER (EMERGENCY)
Age: 39
Discharge: HOME | End: 2024-05-29
Attending: EMERGENCY MEDICINE

## 2024-05-29 VITALS
SYSTOLIC BLOOD PRESSURE: 158 MMHG | DIASTOLIC BLOOD PRESSURE: 106 MMHG | BODY MASS INDEX: 33.32 KG/M2 | HEART RATE: 80 BPM | HEIGHT: 65 IN | OXYGEN SATURATION: 99 % | RESPIRATION RATE: 18 BRPM | WEIGHT: 200 LBS | TEMPERATURE: 99.4 F

## 2024-05-29 DIAGNOSIS — J02.0 STREP PHARYNGITIS: Primary | ICD-10-CM

## 2024-05-29 LAB
FLUAV RNA RESP QL NAA+PROBE: NOT DETECTED
FLUBV RNA RESP QL NAA+PROBE: NOT DETECTED
RSV AG NPH QL IA.RAPID: NOT DETECTED
S PYO DNA THROAT QL NAA+PROBE: DETECTED
SARS-COV-2 RNA RESP QL NAA+PROBE: NOT DETECTED
SERVICE CMNT-IMP: NORMAL
SERVICE CMNT-IMP: NORMAL

## 2024-05-29 PROCEDURE — 10004651 HB RX, NO CHARGE ITEM: Performed by: PHYSICIAN ASSISTANT

## 2024-05-29 PROCEDURE — 96372 THER/PROPH/DIAG INJ SC/IM: CPT | Performed by: PHYSICIAN ASSISTANT

## 2024-05-29 PROCEDURE — 0241U COVID/FLU/RSV PANEL: CPT | Performed by: PHYSICIAN ASSISTANT

## 2024-05-29 PROCEDURE — 10002800 HB RX 250 W HCPCS: Performed by: PHYSICIAN ASSISTANT

## 2024-05-29 PROCEDURE — 87651 STREP A DNA AMP PROBE: CPT | Performed by: PHYSICIAN ASSISTANT

## 2024-05-29 PROCEDURE — 99283 EMERGENCY DEPT VISIT LOW MDM: CPT

## 2024-05-29 RX ORDER — DEXAMETHASONE SODIUM PHOSPHATE 10 MG/ML
10 INJECTION, SOLUTION INTRAMUSCULAR; INTRAVENOUS ONCE
Status: COMPLETED | OUTPATIENT
Start: 2024-05-29 | End: 2024-05-29

## 2024-05-29 RX ORDER — AMOXICILLIN 500 MG/1
500 CAPSULE ORAL 2 TIMES DAILY
Qty: 20 CAPSULE | Refills: 0 | Status: SHIPPED | OUTPATIENT
Start: 2024-05-29 | End: 2024-06-08

## 2024-05-29 RX ORDER — ACETAMINOPHEN 500 MG
1000 TABLET ORAL ONCE
Status: COMPLETED | OUTPATIENT
Start: 2024-05-29 | End: 2024-05-29

## 2024-05-29 RX ADMIN — ACETAMINOPHEN 1000 MG: 500 TABLET ORAL at 18:13

## 2024-05-29 RX ADMIN — DEXAMETHASONE SODIUM PHOSPHATE 10 MG: 10 INJECTION, SOLUTION INTRAMUSCULAR; INTRAVENOUS at 18:14

## 2024-05-29 SDOH — SOCIAL STABILITY: SOCIAL INSECURITY: HOW OFTEN DOES ANYONE, INCLUDING FAMILY AND FRIENDS, PHYSICALLY HURT YOU?: NEVER

## 2024-05-29 SDOH — SOCIAL STABILITY: SOCIAL INSECURITY: HOW OFTEN DOES ANYONE, INCLUDING FAMILY AND FRIENDS, SCREAM OR CURSE AT YOU?: NEVER

## 2024-05-29 SDOH — SOCIAL STABILITY: SOCIAL INSECURITY: HOW OFTEN DOES ANYONE, INCLUDING FAMILY AND FRIENDS, THREATEN YOU WITH HARM?: NEVER

## 2024-05-29 SDOH — SOCIAL STABILITY: SOCIAL INSECURITY: HOW OFTEN DOES ANYONE, INCLUDING FAMILY AND FRIENDS, INSULT OR TALK DOWN TO YOU?: NEVER

## 2024-05-29 ASSESSMENT — PAIN SCALES - GENERAL
PAINLEVEL_OUTOF10: 2
PAINLEVEL_OUTOF10: 6

## (undated) NOTE — LETTER
4/16/2021          To Whom It May Concern:    Esdras Childers is currently under my medical care and needs to be on light duty or work from home, for the next 8 weeks due to a severe ankle injury.   Activity is restricted as follows: light duty    If you re

## (undated) NOTE — LETTER
AUTHORIZATION FOR SURGICAL OPERATION OR OTHER PROCEDURE    1.  I hereby authorize Dr. El Pennington  and [address] [place] [address] staff assigned to my case to perform the following operation and/or procedure at the [address] [place] [address]:    Closed reduction of leg fra Time:  ________ A. M.  P.M.        Patient Name:  ______________________________________________________  (please print)      Patient signature:  ___________________________________________________             Relationship to Patient:

## (undated) NOTE — LETTER
3/30/2021          To Whom It May Concern:    Sharlene Marie is currently under my medical care. She may return to work on 4/5/21 with the following restrictions: she cannot walk up and down stairs.      If you require additional information please contac

## (undated) NOTE — ED AVS SNAPSHOT
Zahra Alvares   MRN: [id number]    Department:  [facility] Emergency Department   Date of Visit:  3/16/2020           Disclosure     Insurance plans vary and the physician(s) referred by the ER may not be covered by your plan.  Please contact CARE PHYSICIAN AT ONCE OR RETURN IMMEDIATELY TO THE EMERGENCY DEPARTMENT. If you have been prescribed any medication(s), please fill your prescription right away and begin taking the medication(s) as directed.   If you believe that any of the medications

## (undated) NOTE — LETTER
AUTHORIZATION FOR SURGICAL OPERATION OR OTHER PROCEDURE    1.  I hereby authorize Dr. Luanne Johnson  and [facility] staff assigned to my case to perform the following operation and/or procedure at the [facility]:    Closed reduction of Left  l Time:  ________ A. M.  P.M.        Patient Name:  ______________________________________________________  (please print)      Patient signature:  ___________________________________________________             Relationship to Patient: